# Patient Record
Sex: FEMALE | Race: WHITE | NOT HISPANIC OR LATINO | Employment: FULL TIME | URBAN - METROPOLITAN AREA
[De-identification: names, ages, dates, MRNs, and addresses within clinical notes are randomized per-mention and may not be internally consistent; named-entity substitution may affect disease eponyms.]

---

## 2017-01-25 ENCOUNTER — TELEPHONE (OUTPATIENT)
Dept: PSYCHIATRY | Facility: CLINIC | Age: 22
End: 2017-01-25

## 2017-01-31 ENCOUNTER — OFFICE VISIT (OUTPATIENT)
Dept: PSYCHIATRY | Facility: CLINIC | Age: 22
End: 2017-01-31
Payer: COMMERCIAL

## 2017-01-31 VITALS
HEART RATE: 76 BPM | SYSTOLIC BLOOD PRESSURE: 125 MMHG | DIASTOLIC BLOOD PRESSURE: 75 MMHG | WEIGHT: 175 LBS | BODY MASS INDEX: 28.46 KG/M2

## 2017-01-31 DIAGNOSIS — F41.0 PANIC ATTACK: Primary | ICD-10-CM

## 2017-01-31 PROCEDURE — 99214 OFFICE O/P EST MOD 30 MIN: CPT | Performed by: CLINICAL NURSE SPECIALIST

## 2017-01-31 RX ORDER — MIRTAZAPINE 30 MG/1
30 TABLET, FILM COATED ORAL AT BEDTIME
Qty: 30 TABLET | Refills: 3 | Status: SHIPPED | OUTPATIENT
Start: 2017-01-31 | End: 2017-03-09 | Stop reason: DRUGHIGH

## 2017-01-31 ASSESSMENT — ANXIETY QUESTIONNAIRES
6. BECOMING EASILY ANNOYED OR IRRITABLE: MORE THAN HALF THE DAYS
3. WORRYING TOO MUCH ABOUT DIFFERENT THINGS: NEARLY EVERY DAY
GAD7 TOTAL SCORE: 20
5. BEING SO RESTLESS THAT IT IS HARD TO SIT STILL: NEARLY EVERY DAY
2. NOT BEING ABLE TO STOP OR CONTROL WORRYING: NEARLY EVERY DAY
1. FEELING NERVOUS, ANXIOUS, OR ON EDGE: NEARLY EVERY DAY
7. FEELING AFRAID AS IF SOMETHING AWFUL MIGHT HAPPEN: NEARLY EVERY DAY

## 2017-01-31 ASSESSMENT — PATIENT HEALTH QUESTIONNAIRE - PHQ9: 5. POOR APPETITE OR OVEREATING: NEARLY EVERY DAY

## 2017-01-31 NOTE — PATIENT INSTRUCTIONS
Treatment Plan:  Continue mirtazapine (Remeron) 30 mg at bedtime.   Restart exercise routine.   Engage in social activities.  Continue light therapy.   Consider individual therapy.   Follow up in 2 months.       - Safety plan was reviewed; to the ER as needed or call after hours crisis line; [phone]  - Education and counseling was done regarding use of medications, psychotherapy options  - Call [phone] for appointment or to speak to a nurse.    -Office hours: Monday through Thursday 8:00 am to 4:30 pm; Friday 8:00 am to Noon.

## 2017-01-31 NOTE — MR AVS SNAPSHOT
"              After Visit Summary   1/31/2017    Pauly Callejas    MRN: [id number]           Patient Information     Date Of Birth          1995        Visit Information        Provider Department      1/31/2017 3:15 PM Patria Xavier APRN CNS [facility]        Today's Diagnoses     Panic attack    -  1       Care Instructions    Treatment Plan:  Continue mirtazapine (Remeron) 30 mg at bedtime.   Restart exercise routine.   Engage in social activities.  Continue light therapy.   Consider individual therapy.   Follow up in 2 months.       - Safety plan was reviewed; to the ER as needed or call after hours crisis line; [phone]  - Education and counseling was done regarding use of medications, psychotherapy options  - Call [phone] for appointment or to speak to a nurse.    -Office hours: Monday through Thursday 8:00 am to 4:30 pm; Friday 8:00 am to Noon.           Follow-ups after your visit        Who to contact     If you have questions or need follow up information about today's clinic visit or your schedule please contact [facility] directly at [phone].  Normal or non-critical lab and imaging results will be communicated to you by BuzzFeed[employer], letter or phone within 4 business days after the clinic has received the results. If you do not hear from us within 7 days, please contact the clinic through [employer] or phone. If you have a critical or abnormal lab result, we will notify you by phone as soon as possible.  Submit refill requests through [facility] or call your pharmacy and they will forward the refill request to us. Please allow 3 business days for your refill to be completed.          Additional Information About Your Visit        BuzzFeed[employer] Information     [facility] [facility] lets you send messages to your doctor, view your test results, renew your prescriptions, schedule appointments and more. To sign up, go to [email or web address] . Click on \"Log in\" on the left " "side of the screen, which will take you to the Welcome page. Then click on \"Sign up Now\" on the right side of the page.     You will be asked to enter the access code listed below, as well as some personal information. Please follow the directions to create your username and password.     Your access code is: [email or web address]  Expires: 2017 12:23 PM     Your access code will  in 90 days. If you need help or a new code, please call your [facility] or [phone].        Care EveryWhere ID     This is your Care EveryWhere ID. This could be used by other organizations to access your [place] medical records  [id number]        Your Vitals Were     Pulse                   76            Blood Pressure from Last 3 Encounters:   17 125/75   16 128/73   16 122/76    Weight from Last 3 Encounters:   17 175 lb (79.379 kg)   16 176 lb (79.833 kg)   16 168 lb (76.204 kg)              Today, you had the following     No orders found for display         Where to get your medicines      These medications were sent to [facility] [address] [place] [address] AT [facility] & [address] [address]     Phone:  [phone]    - mirtazapine 30 MG tablet       Primary Care Provider Office Phone # Fax #    Rosa Rojas PA-C [phone]       [facility] [address] [facility] [address]        Thank you!     Thank you for choosing [facility]  for your care. Our goal is always to provide you with excellent care. Hearing back from our patients is one way we can continue to improve our services. Please take a few minutes to complete the written survey that you may receive in the mail after your visit with us. Thank you!             Your Updated Medication List - Protect others around you: Learn how to safely use, store and throw away your medicines at " www.disposemymeds.org.          This list is accurate as of: 1/31/17  3:46 PM.  Always use your most recent med list.                   Brand Name Dispense Instructions for use    ibuprofen 200 MG capsule      Take 200 mg by mouth every 6 hours as needed. 3 tabs as needed       LORazepam 1 MG tablet    ATIVAN    20 tablet    Take 1 tablet (1 mg) by mouth daily as needed for anxiety       mirtazapine 30 MG tablet    REMERON    30 tablet    Take 1 tablet (30 mg) by mouth At Bedtime       norethindrone-ethinyl estradiol-iron 1.5-30 MG-MCG per tablet    MICROGESTIN FE1.5/30    112 tablet    Take 1 tablet by mouth daily Continuously

## 2017-01-31 NOTE — PROGRESS NOTES
"      Psychiatric Progress Note    Name: Pauly Callejas  Date: 1/31/2017  Length of Visit: 30 minutes  MRN: [id number]      Current Outpatient Prescriptions   Medication Sig     LORazepam (ATIVAN) 1 MG tablet Take 1 tablet (1 mg) by mouth daily as needed for anxiety     mirtazapine (REMERON) 30 MG tablet Take 1 tablet (30 mg) by mouth At Bedtime     norethindrone-ethinyl estradiol-iron (MICROGESTIN FE1.5/30) 1.5-30 MG-MCG per tablet Take 1 tablet by mouth daily Continuously     Ibuprofen 200 MG capsule Take 200 mg by mouth every 6 hours as needed. 3 tabs as needed     No current facility-administered medications for this visit.       Therapist:  None    PHQ-9:  PHQ-9 score:    PHQ-9 SCORE 1/31/2017   Total Score -   Total Score 19       KASANDRA-7:  KASANDRA-7 SCORE 11/3/2016 12/13/2016 1/31/2017   Total Score 21 15 20           Interim History:  Patient returns for follow up from appointment 12-. Mirtazapine (Remeron) was increased to 30 mg daily and lorazepam (Ativan) 1 mg daily as needed was continued. Light therapy and exercise was recommended. Today, patient reports not doing as well. Patient reports starting a second job two days ago, then decided against it due to work environment. Patient states \"I'm so broke\", having $17 left after paying her bills. Patient is now able to work overtime at her [employer] job. Parents \"are wrapping up their divorce\" so patient can move in with her mother which patient is viewing as positive. Patient reports therapy was not helpful due patient feeling therapist had her own agenda. Patient would like to work on separation anxiety and \"cling\" to her boyfriend. We had a lengthy discussion regarding finding activities that she can do independently from boyfriend. Patient reports taking lorazepam (Ativan) 1 mg approximately every 1-2 weeks.       Past Medical History   Diagnosis Date     Panic disorder      Depression with anxiety        10 point ROS is negative except for those " "listed above.     Vital Signs:   /75 mmHg  Pulse 76  Wt 175 lb (79.379 kg)      Mental Status Assessment:  Appearance:  Well groomed      Behavior/relationship to examiner/demeanor:  Cooperative, engaged and pleasant  Motor activity:  Normal  Gait:  Normal   Speech:  Normal in volume, articulation, coherence   Mood (subjective report):  \"We'll see\"  Affect (objective appearance):  Mood congruent  Thought Process (Associations):  Logical, linear and goal directed  Thought content:  No evidence of suicidal or homicidal ideation,          no overt psychosis and                    patient does not appear to be responding to internal stimuli  Oriented to person, place, date/time   Attention Span and concentration: Intact   Memory:  Short-term memory intact and Long-term memory; Intact  Language:  Fluent   Fund of Knowledge/Intelligence:  Average  Use of language: Intact   Abstraction:  Normal  Insight:  Adequate  Judgment:  Adequate for safety    DSM DIAGNOSIS:  296.32 Major Depressive Disorder, Recurrent Episode, Moderate _ and With anxious distress  300.02 (F41.1) Generalized Anxiety Disorder      Assessment:  Patient reports her mood declined as she recognizes she has no activities that she enjoys independent of her boyfriend. She was able to list several activities she enjoyed while in college and is encouraged to seek out like-minded people to establish friendships. Mirtazapine (Remeron) may require increase and patient agrees to contact this office if she is struggling even though she has made an effort to find enjoyable activities.     Medication side effects and alternatives were reviewed.     Treatment Plan:  Continue mirtazapine (Remeron) 30 mg at bedtime.   Restart exercise routine.   Engage in social activities.  Continue light therapy.   Consider individual therapy.   Follow up in 2 months.       - Safety plan was reviewed; to the ER as needed or call after hours crisis line; [phone]  - " Education and counseling was done regarding use of medications, psychotherapy options  - Call [phone] for appointment or to speak to a nurse.    -Office hours: Monday through Thursday 8:00 am to 4:30 pm; Friday 8:00 am to Noon.             Signed:  Patria Xavier RN, MS, CNS-BC

## 2017-02-01 ASSESSMENT — ANXIETY QUESTIONNAIRES: GAD7 TOTAL SCORE: 20

## 2017-02-01 ASSESSMENT — PATIENT HEALTH QUESTIONNAIRE - PHQ9: SUM OF ALL RESPONSES TO PHQ QUESTIONS 1-9: 19

## 2017-03-09 ENCOUNTER — OFFICE VISIT (OUTPATIENT)
Dept: PSYCHIATRY | Facility: CLINIC | Age: 22
End: 2017-03-09
Payer: COMMERCIAL

## 2017-03-09 VITALS
HEIGHT: 66 IN | BODY MASS INDEX: 28.93 KG/M2 | TEMPERATURE: 98.6 F | SYSTOLIC BLOOD PRESSURE: 125 MMHG | DIASTOLIC BLOOD PRESSURE: 81 MMHG | HEART RATE: 86 BPM | WEIGHT: 180 LBS | RESPIRATION RATE: 16 BRPM

## 2017-03-09 DIAGNOSIS — F41.1 GAD (GENERALIZED ANXIETY DISORDER): ICD-10-CM

## 2017-03-09 DIAGNOSIS — F41.0 PANIC ATTACK: ICD-10-CM

## 2017-03-09 DIAGNOSIS — F33.1 MAJOR DEPRESSIVE DISORDER, RECURRENT EPISODE, MODERATE (H): Primary | ICD-10-CM

## 2017-03-09 PROCEDURE — 99214 OFFICE O/P EST MOD 30 MIN: CPT | Performed by: CLINICAL NURSE SPECIALIST

## 2017-03-09 RX ORDER — MIRTAZAPINE 45 MG/1
45 TABLET, FILM COATED ORAL AT BEDTIME
Qty: 90 TABLET | Refills: 1 | Status: SHIPPED | OUTPATIENT
Start: 2017-03-09 | End: 2021-08-26

## 2017-03-09 RX ORDER — HYDROXYZINE HYDROCHLORIDE 25 MG/1
25-50 TABLET, FILM COATED ORAL EVERY 6 HOURS PRN
Qty: 60 TABLET | Refills: 1 | Status: SHIPPED | OUTPATIENT
Start: 2017-03-09

## 2017-03-09 ASSESSMENT — ANXIETY QUESTIONNAIRES
5. BEING SO RESTLESS THAT IT IS HARD TO SIT STILL: NEARLY EVERY DAY
IF YOU CHECKED OFF ANY PROBLEMS ON THIS QUESTIONNAIRE, HOW DIFFICULT HAVE THESE PROBLEMS MADE IT FOR YOU TO DO YOUR WORK, TAKE CARE OF THINGS AT HOME, OR GET ALONG WITH OTHER PEOPLE: EXTREMELY DIFFICULT
GAD7 TOTAL SCORE: 21
2. NOT BEING ABLE TO STOP OR CONTROL WORRYING: NEARLY EVERY DAY
7. FEELING AFRAID AS IF SOMETHING AWFUL MIGHT HAPPEN: NEARLY EVERY DAY
1. FEELING NERVOUS, ANXIOUS, OR ON EDGE: NEARLY EVERY DAY
3. WORRYING TOO MUCH ABOUT DIFFERENT THINGS: NEARLY EVERY DAY
6. BECOMING EASILY ANNOYED OR IRRITABLE: NEARLY EVERY DAY

## 2017-03-09 ASSESSMENT — PATIENT HEALTH QUESTIONNAIRE - PHQ9: 5. POOR APPETITE OR OVEREATING: NEARLY EVERY DAY

## 2017-03-09 NOTE — PROGRESS NOTES
"      Psychiatric Progress Note    Name: Pauly Callejas  Date: 3/9/2017  Length of Visit: 30 minutes  MRN: [id number]      Current Outpatient Prescriptions   Medication Sig     mirtazapine (REMERON) 30 MG tablet Take 1 tablet (30 mg) by mouth At Bedtime     LORazepam (ATIVAN) 1 MG tablet Take 1 tablet (1 mg) by mouth daily as needed for anxiety     norethindrone-ethinyl estradiol-iron (MICROGESTIN FE1.5/30) 1.5-30 MG-MCG per tablet Take 1 tablet by mouth daily Continuously     Ibuprofen 200 MG capsule Take 200 mg by mouth every 6 hours as needed. 3 tabs as needed     No current facility-administered medications for this visit.        Therapist:  None - has appointment scheduled 3-.     PHQ-9:  PHQ-9 score:    PHQ-9 SCORE 3/9/2017   Total Score 24       KASANDRA-7:  KASANDRA-7 SCORE 12/13/2016 1/31/2017 3/9/2017   Total Score 15 20 21           Interim History:  Patient returns for follow up from appointment 1-. Mirtazapine (Remeron) 30 mg at bedtime was continued. Recommendations for exercise, social activities, light therapy, and individual therapy.     Today, patient reports increased anxiety. Patient denies triggers. Patient reports feeling anxious most days, all day.  Patient states she has been \"jamal playing guitar\".  She reports she started exercising this week. She has scheduled with a therapist.       Past Medical History   Diagnosis Date     Depression with anxiety      Panic disorder        10 point ROS is negative except for those listed above.    Vital Signs:   /81  Pulse 86  Temp 98.6  F (37  C) (Tympanic)  Resp 16  Ht 5' 6\" (1.676 m)  Wt 180 lb (81.6 kg)  LMP 03/04/2017  BMI 29.05 kg/m2      Mental Status Assessment:  Appearance:  Well groomed      Behavior/relationship to examiner/demeanor:  Cooperative, engaged and pleasant  Motor activity:  Normal  Gait:  Normal   Speech:  Normal in volume, articulation, coherence   Mood (subjective report):  \"I'm so anxious\"  Affect (objective " appearance):  Mood congruent  Thought Process (Associations):  Logical, linear and goal directed  Thought content:  No evidence of suicidal or homicidal ideation,          no overt psychosis and                    patient does not appear to be responding to internal stimuli  Oriented to person, place, date/time   Attention Span and concentration: Intact   Memory:  Short-term memory intact and Long-term memory; Intact  Language:  Fluent   Fund of Knowledge/Intelligence:  Average  Use of language: Intact   Abstraction:  Normal  Insight:  Adequate  Judgment:  Adequate for safety    DSM DIAGNOSIS:  296.32 Major Depressive Disorder, Recurrent Episode, Moderate _ and With anxious distress  300.02 (F41.1) Generalized Anxiety Disorder    Assessment:  Patient is having a difficult time with anxiety. Increasing mirtazapine (Remeron) is likely to be helpful. Patient reports increased use of lorazepam (Ativan) and will utilize hydroxyzine to eliminate lorazepam (Ativan) withdrawal which may be increasing anxiety symptoms.     Medication side effects and alternatives were reviewed.     Treatment Plan:  Increase to mirtazapine (Remeron) 45 mg at bedtime.   Begin hydroxyzine 25-50 mg every 6 hours as needed for anxiety.     Follow up in 6 weeks.     - Recommend patient discuss medications with their pharmacist.   - Safety plan was reviewed; to the ER as needed or call after hours crisis line; [phone]  - Education and counseling was done regarding use of medications, psychotherapy options  - Call [phone] for appointment or to speak to a nurse.    -Office hours: Monday through Thursday 8:00 am to 4:30 pm; Friday 8:00 am to Noon.   - Patient received a copy of this Treatment Plan today.    Patient will continue to be seen for ongoing consultation and stabilization.      Signed:  Patria Xavier, RN, MS, CNS-BC

## 2017-03-09 NOTE — MR AVS SNAPSHOT
After Visit Summary   3/9/2017    Pauly Callejas    MRN: [id number]           Patient Information     Date Of Birth          1995        Visit Information        Provider Department      3/9/2017 2:45 PM Patria Xavier APRN CNS [facility]        Today's Diagnoses     Major depressive disorder, recurrent episode, moderate (H)    -  1    KASANDRA (generalized anxiety disorder)        Panic attack          Care Instructions    Treatment Plan:  Increase to mirtazapine (Remeron) 45 mg at bedtime.   Begin hydroxyzine 25-50 mg every 6 hours as needed for anxiety.     Follow up in 6 weeks.     - Recommend patient discuss medications with their pharmacist.   - Safety plan was reviewed; to the ER as needed or call after hours crisis line; [phone]  - Education and counseling was done regarding use of medications, psychotherapy options  - Call [phone] for appointment or to speak to a nurse.    -Office hours: Monday through Thursday 8:00 am to 4:30 pm; Friday 8:00 am to Noon.   - Patient received a copy of this Treatment Plan toda        Follow-ups after your visit        Who to contact     If you have questions or need follow up information about today's clinic visit or your schedule please contact [facility] directly at [phone].  Normal or non-critical lab and imaging results will be communicated to you by Entre[employer], letter or phone within 4 business days after the clinic has received the results. If you do not hear from us within 7 days, please contact the clinic through [email or web address] or phone. If you have a critical or abnormal lab result, we will notify you by phone as soon as possible.  Submit refill requests through [employer] or call your pharmacy and they will forward the refill request to us. Please allow 3 business days for your refill to be completed.          Additional Information About Your Visit        [employer] Information     [employer] lets you send messages  "to your doctor, view your test results, renew your prescriptions, schedule appointments and more. To sign up, go to [email or web address] . Click on \"Log in\" on the left side of the screen, which will take you to the Welcome page. Then click on \"Sign up Now\" on the right side of the page.     You will be asked to enter the access code listed below, as well as some personal information. Please follow the directions to create your username and password.     Your access code is: [email or web address]  Expires: 2017  3:14 PM     Your access code will  in 90 days. If you need help or a new code, please call your [place] clinic or [phone].        Care EveryWhere ID     This is your Care EveryWhere ID. This could be used by other organizations to access your [place] medical records  [id number]        Your Vitals Were     Pulse Temperature Respirations Height Last Period BMI (Body Mass Index)    86 98.6  F (37  C) (Tympanic) 16 5' 6\" (1.676 m) 2017 29.05 kg/m2       Blood Pressure from Last 3 Encounters:   17 125/81   17 125/75   16 128/73    Weight from Last 3 Encounters:   17 180 lb (81.6 kg)   17 175 lb (79.4 kg)   16 176 lb (79.8 kg)              Today, you had the following     No orders found for display         Today's Medication Changes          These changes are accurate as of: 3/9/17  3:14 PM.  If you have any questions, ask your nurse or doctor.               Start taking these medicines.        Dose/Directions    hydrOXYzine 25 MG tablet   Commonly known as:  ATARAX   Used for:  KASANDRA (generalized anxiety disorder)        Dose:  25-50 mg   Take 1-2 tablets (25-50 mg) by mouth every 6 hours as needed for anxiety   Quantity:  60 tablet   Refills:  1         These medicines have changed or have updated prescriptions.        Dose/Directions    mirtazapine 45 MG tablet   Commonly known as:  REMERON   This may have changed:    - medication strength  - how much to " take   Used for:  Major depressive disorder, recurrent episode, moderate (H), KASANDRA (generalized anxiety disorder)        Dose:  45 mg   Take 1 tablet (45 mg) by mouth At Bedtime   Quantity:  90 tablet   Refills:  1            Where to get your medicines      These medications were sent to [id number] - [place] - [address] AT [facility]  [address], [facility] [address]     Phone:  [phone]     hydrOXYzine 25 MG tablet    mirtazapine 45 MG tablet                Primary Care Provider Office Phone # Fax #    Rosa Rojas PA-C [phone]       [facility] [address] [facility] [address]        Thank you!     Thank you for choosing [facility]  for your care. Our goal is always to provide you with excellent care. Hearing back from our patients is one way we can continue to improve our services. Please take a few minutes to complete the written survey that you may receive in the mail after your visit with us. Thank you!             Your Updated Medication List - Protect others around you: Learn how to safely use, store and throw away your medicines at www.disposemymeds.org.          This list is accurate as of: 3/9/17  3:14 PM.  Always use your most recent med list.                   Brand Name Dispense Instructions for use    hydrOXYzine 25 MG tablet    ATARAX    60 tablet    Take 1-2 tablets (25-50 mg) by mouth every 6 hours as needed for anxiety       ibuprofen 200 MG capsule      Take 200 mg by mouth every 6 hours as needed. 3 tabs as needed       LORazepam 1 MG tablet    ATIVAN    20 tablet    Take 1 tablet (1 mg) by mouth daily as needed for anxiety       mirtazapine 45 MG tablet    REMERON    90 tablet    Take 1 tablet (45 mg) by mouth At Bedtime       norethindrone-ethinyl estradiol-iron 1.5-30 MG-MCG per tablet    MICROGESTIN FE1.5/30    112 tablet    Take 1 tablet by mouth daily  Continuously

## 2017-03-09 NOTE — PATIENT INSTRUCTIONS
Treatment Plan:  Increase to mirtazapine (Remeron) 45 mg at bedtime.   Begin hydroxyzine 25-50 mg every 6 hours as needed for anxiety.     Follow up in 6 weeks.     - Recommend patient discuss medications with their pharmacist.   - Safety plan was reviewed; to the ER as needed or call after hours crisis line; [phone]  - Education and counseling was done regarding use of medications, psychotherapy options  - Call [phone] for appointment or to speak to a nurse.    -Office hours: Monday through Thursday 8:00 am to 4:30 pm; Friday 8:00 am to Noon.   - Patient received a copy of this Treatment Plan toda

## 2017-03-10 ASSESSMENT — PATIENT HEALTH QUESTIONNAIRE - PHQ9: SUM OF ALL RESPONSES TO PHQ QUESTIONS 1-9: 24

## 2017-03-10 ASSESSMENT — ANXIETY QUESTIONNAIRES: GAD7 TOTAL SCORE: 21

## 2017-03-13 NOTE — PROGRESS NOTES
SUBJECTIVE:     CC: Pauly Callejas is an 21 year old woman who presents for preventive health visit.     Healthy Habits:    Do you get at least three servings of calcium containing foods daily (dairy, green leafy vegetables, etc.)? not    Amount of exercise or daily activities, outside of work: 0 day(s) per week    Problems taking medications regularly No    Medication side effects: No    Have you had an eye exam in the past two years? Yes 01/15/2017    Do you see a dentist twice per year? yes    Do you have sleep apnea, excessive snoring or daytime drowsiness?no         due for pap next year.   Periods-were heavy so is doing oral contraceptive pill for 3 months at a time.  Going well. No side effects or concerns.     Had biometric screening done in the winter, will bring in these labs. Was not fasting for her lipids but they were okay per patient.     Would like labs to make sure nothing contributing to her anxiety/depression per patient would like celiac, vitamin d, etc.     Had chlamydia in sept.  Of last year, was treated and re-checked and was negative. Would like screening again today. Has been monogamous with boyfriend currently.     Has depression/anxiety that is not doing great, she just saw psychiatrist and is now going to be seeing therapist. Get medication management [facility] [place]-will be starting therapy there soon.   Denies suicidal or homicidal thoughts.  Patient instructed to go to the emergency room or call 911 if these occur.          Today's PHQ-2 Score: No flowsheet data found.    Abuse: Current or Past(Physical, Sexual or Emotional)- No  Do you feel safe in your environment - Yes    Social History   Substance Use Topics     Smoking status: Never Smoker     Smokeless tobacco: Never Used     Alcohol use Yes      Comment: social     The patient does not drink >3 drinks per day nor >7 drinks per week.    No results for input(s): CHOL, HDL, LDL, TRIG, CHOLHDLRATIO,  [facility] in the last 78349 hours.    Reviewed orders with patient.  Reviewed health maintenance and updated orders accordingly - Yes    Mammo Decision Support:  Mammogram not appropriate for this patient based on age.    Pertinent mammograms are reviewed under the imaging tab.  History of abnormal Pap smear: NO - age 21-29 PAP every 3 years recommended    Reviewed and updated as needed this visit by clinical staff  Tobacco  Allergies  Meds  Med Hx  Surg Hx  Fam Hx  Soc Hx        Reviewed and updated as needed this visit by Provider        Past Medical History   Diagnosis Date     Depression with anxiety      Panic disorder       Past Surgical History   Procedure Laterality Date     No history of surgery       Obstetric History       T0      TAB0   SAB0   E0   M0   L0           ROS:  C: NEGATIVE for fever, chills, change in weight  I: NEGATIVE for worrisome rashes, moles or lesions  E: NEGATIVE for vision changes or irritation  ENT: NEGATIVE for ear, mouth and throat problems  R: NEGATIVE for significant cough or SOB  B: NEGATIVE for masses, tenderness or discharge  CV: NEGATIVE for chest pain, palpitations or peripheral edema  GI: NEGATIVE for nausea, abdominal pain, heartburn, or change in bowel habits  : NEGATIVE for unusual urinary or vaginal symptoms. Periods are regular.  M: NEGATIVE for significant arthralgias or myalgia  N: NEGATIVE for weakness, dizziness or paresthesias    Problem list, Medication list, Allergies, and Medical/Social/Surgical histories reviewed in [facility] and updated as appropriate.  OBJECTIVE:     /73  Pulse 75  Temp 98  F (36.7  C) (Oral)  Wt 183 lb (83 kg)  LMP 2017  SpO2 100%  Breastfeeding? No  BMI 29.54 kg/m2  EXAM:  GENERAL: healthy, alert and no distress  EYES: Eyes grossly normal to inspection, PERRL and conjunctivae and sclerae normal  HENT: ear canals and TM's normal, nose and mouth without ulcers or lesions  NECK: no adenopathy, no asymmetry, masses, or  "scars and thyroid normal to palpation  RESP: lungs clear to auscultation - no rales, rhonchi or wheezes  CV: regular rate and rhythm, normal S1 S2, no S3 or S4, no murmur, click or rub, no peripheral edema and peripheral pulses strong  ABDOMEN: soft, nontender, no hepatosplenomegaly, no masses and bowel sounds normal  MS: no gross musculoskeletal defects noted, no edema  NEURO: Normal strength and tone, mentation intact and speech normal  PSYCH: mentation appears normal, affect normal/bright    ASSESSMENT/PLAN:       Routine physical with abnormal findin    1. Depression with anxiety    - TSH with free T4 reflex  - Vitamin D Deficiency  - Vitamin B12  - CBC with platelets differential  - Comprehensive metabolic panel  - Tissue transglutaminase marc IgA and IgG    2. Screening examination for venereal disease    - NEISSERIA GONORRHOEA PCR  - CHLAMYDIA TRACHOMATIS PCR    3. KASANDRA (generalized anxiety disorder)      4. Breakthrough bleeding on birth control pills    - norethindrone-ethinyl estradiol-iron (MICROGESTIN FE1.5/30) 1.5-30 MG-MCG per tablet; Take 1 tablet by mouth daily Continuously. Stop for 1 week every 3 months.  Dispense: 112 tablet; Refill: 3    Routine physical with abnormal findings.       COUNSELING:   Reviewed preventive health counseling, as reflected in patient instructions       Regular exercise       Healthy diet/nutrition       Vision screening       Hearing screening         reports that she has never smoked. She has never used smokeless tobacco.    Estimated body mass index is 29.54 kg/(m^2) as calculated from the following:    Height as of 3/9/17: 5' 6\" (1.676 m).    Weight as of this encounter: 183 lb (83 kg).     Patient Instructions     Preventive Health Recommendations  Female Ages 18 to 25     Yearly exam:     See your health care provider every year in order to  o Review health changes.   o Discuss preventive care.    o Review your medicines if your doctor has prescribed any.      You " should be tested each year for STDs (sexually transmitted diseases).       After age 20, talk to your provider about how often you should have cholesterol testing.      Starting at age 21, get a Pap test every three years. If you have an abnormal result, your doctor may have you test more often.      If you are at risk for diabetes, you should have a diabetes test (fasting glucose).     Shots:     Get a flu shot each year.     Get a tetanus shot every 10 years.     Consider getting the shot (vaccine) that prevents cervical cancer (Gardasil).    Nutrition:     Eat at least 5 servings of fruits and vegetables each day.    Eat whole-grain bread, whole-wheat pasta and brown rice instead of white grains and rice.    Talk to your provider about Calcium and Vitamin D.     Lifestyle    Exercise at least 150 minutes a week each week (30 minutes a day, 5 days a week). This will help you control your weight and prevent disease.    Limit alcohol to one drink per day.    No smoking.     Wear sunscreen to prevent skin cancer.    See your dentist every six months for an exam and cleaning.            Counseling Resources:  ATP IV Guidelines  Pooled Cohorts Equation Calculator  Breast Cancer Risk Calculator  FRAX Risk Assessment  ICSI Preventive Guidelines  Dietary Guidelines for Americans, 2010  USDA's MyPlate  ASA Prophylaxis  Lung CA Screening    Rosa Rojas PA-C  [facility]

## 2017-03-14 ENCOUNTER — OFFICE VISIT (OUTPATIENT)
Dept: FAMILY MEDICINE | Facility: CLINIC | Age: 22
End: 2017-03-14
Payer: COMMERCIAL

## 2017-03-14 VITALS
WEIGHT: 183 LBS | HEART RATE: 75 BPM | BODY MASS INDEX: 29.54 KG/M2 | OXYGEN SATURATION: 100 % | DIASTOLIC BLOOD PRESSURE: 73 MMHG | TEMPERATURE: 98 F | SYSTOLIC BLOOD PRESSURE: 136 MMHG

## 2017-03-14 DIAGNOSIS — N92.1 BREAKTHROUGH BLEEDING ON BIRTH CONTROL PILLS: ICD-10-CM

## 2017-03-14 DIAGNOSIS — F41.1 GAD (GENERALIZED ANXIETY DISORDER): ICD-10-CM

## 2017-03-14 DIAGNOSIS — Z11.3 SCREENING EXAMINATION FOR VENEREAL DISEASE: ICD-10-CM

## 2017-03-14 DIAGNOSIS — F41.8 DEPRESSION WITH ANXIETY: ICD-10-CM

## 2017-03-14 DIAGNOSIS — Z00.01 ENCOUNTER FOR ROUTINE ADULT HEALTH EXAMINATION WITH ABNORMAL FINDINGS: Primary | ICD-10-CM

## 2017-03-14 DIAGNOSIS — F33.1 MODERATE EPISODE OF RECURRENT MAJOR DEPRESSIVE DISORDER (H): ICD-10-CM

## 2017-03-14 LAB
ALBUMIN SERPL-MCNC: 3.6 G/DL (ref 3.4–5)
ALP SERPL-CCNC: 60 U/L (ref 40–150)
ALT SERPL W P-5'-P-CCNC: 21 U/L (ref 0–50)
ANION GAP SERPL CALCULATED.3IONS-SCNC: 8 MMOL/L (ref 3–14)
AST SERPL W P-5'-P-CCNC: 12 U/L (ref 0–45)
BASOPHILS # BLD AUTO: 0 10E9/L (ref 0–0.2)
BASOPHILS NFR BLD AUTO: 0.4 %
BILIRUB SERPL-MCNC: 0.2 MG/DL (ref 0.2–1.3)
BUN SERPL-MCNC: 7 MG/DL (ref 7–30)
CALCIUM SERPL-MCNC: 9.2 MG/DL (ref 8.5–10.1)
CHLORIDE SERPL-SCNC: 105 MMOL/L (ref 94–109)
CO2 SERPL-SCNC: 26 MMOL/L (ref 20–32)
CREAT SERPL-MCNC: 0.74 MG/DL (ref 0.52–1.04)
DIFFERENTIAL METHOD BLD: NORMAL
EOSINOPHIL # BLD AUTO: 0.2 10E9/L (ref 0–0.7)
EOSINOPHIL NFR BLD AUTO: 2.6 %
ERYTHROCYTE [DISTWIDTH] IN BLOOD BY AUTOMATED COUNT: 12.7 % (ref 10–15)
GFR SERPL CREATININE-BSD FRML MDRD: NORMAL ML/MIN/1.7M2
GLUCOSE SERPL-MCNC: 98 MG/DL (ref 70–99)
HCT VFR BLD AUTO: 38.4 % (ref 35–47)
HGB BLD-MCNC: 12.2 G/DL (ref 11.7–15.7)
LYMPHOCYTES # BLD AUTO: 2.8 10E9/L (ref 0.8–5.3)
LYMPHOCYTES NFR BLD AUTO: 38.4 %
MCH RBC QN AUTO: 27.7 PG (ref 26.5–33)
MCHC RBC AUTO-ENTMCNC: 31.8 G/DL (ref 31.5–36.5)
MCV RBC AUTO: 87 FL (ref 78–100)
MONOCYTES # BLD AUTO: 0.7 10E9/L (ref 0–1.3)
MONOCYTES NFR BLD AUTO: 9.3 %
NEUTROPHILS # BLD AUTO: 3.6 10E9/L (ref 1.6–8.3)
NEUTROPHILS NFR BLD AUTO: 49.3 %
PLATELET # BLD AUTO: 312 10E9/L (ref 150–450)
POTASSIUM SERPL-SCNC: 3.7 MMOL/L (ref 3.4–5.3)
PROT SERPL-MCNC: 8.1 G/DL (ref 6.8–8.8)
RBC # BLD AUTO: 4.4 10E12/L (ref 3.8–5.2)
SODIUM SERPL-SCNC: 139 MMOL/L (ref 133–144)
TSH SERPL DL<=0.005 MIU/L-ACNC: 2.35 MU/L (ref 0.4–4)
VIT B12 SERPL-MCNC: 298 PG/ML (ref 193–986)
WBC # BLD AUTO: 7.2 10E9/L (ref 4–11)

## 2017-03-14 PROCEDURE — 80050 GENERAL HEALTH PANEL: CPT | Performed by: PHYSICIAN ASSISTANT

## 2017-03-14 PROCEDURE — 99395 PREV VISIT EST AGE 18-39: CPT | Performed by: PHYSICIAN ASSISTANT

## 2017-03-14 PROCEDURE — 82306 VITAMIN D 25 HYDROXY: CPT | Performed by: PHYSICIAN ASSISTANT

## 2017-03-14 PROCEDURE — 87591 N.GONORRHOEAE DNA AMP PROB: CPT | Performed by: PHYSICIAN ASSISTANT

## 2017-03-14 PROCEDURE — 83516 IMMUNOASSAY NONANTIBODY: CPT | Performed by: PHYSICIAN ASSISTANT

## 2017-03-14 PROCEDURE — 36415 COLL VENOUS BLD VENIPUNCTURE: CPT | Performed by: PHYSICIAN ASSISTANT

## 2017-03-14 PROCEDURE — 87491 CHLMYD TRACH DNA AMP PROBE: CPT | Performed by: PHYSICIAN ASSISTANT

## 2017-03-14 PROCEDURE — 82607 VITAMIN B-12: CPT | Performed by: PHYSICIAN ASSISTANT

## 2017-03-14 RX ORDER — NORETHINDRONE ACETATE AND ETHINYL ESTRADIOL 1.5-30(21)
1 KIT ORAL DAILY
Qty: 112 TABLET | Refills: 3 | Status: SHIPPED | OUTPATIENT
Start: 2017-03-14 | End: 2018-04-01

## 2017-03-14 NOTE — Clinical Note
Please abstract the following data from this visit with this patient into the appropriate field in Epic:  Pap smear done on this date: 04/01/2015 (approximately), by this group: [facility] results were normal.

## 2017-03-14 NOTE — MR AVS SNAPSHOT
After Visit Summary   3/14/2017    Pauly Callejas    MRN: [id number]           Patient Information     Date Of Birth          1995        Visit Information        Provider Department      3/14/2017 3:20 PM Rosa Rojas PA-C [facility]        Today's Diagnoses     Depression with anxiety    -  1    Screening examination for venereal disease        KASANDRA (generalized anxiety disorder)        Breakthrough bleeding on birth control pills          Care Instructions      Preventive Health Recommendations  Female Ages 18 to 25     Yearly exam:     See your health care provider every year in order to  o Review health changes.   o Discuss preventive care.    o Review your medicines if your doctor has prescribed any.      You should be tested each year for STDs (sexually transmitted diseases).       After age 20, talk to your provider about how often you should have cholesterol testing.      Starting at age 21, get a Pap test every three years. If you have an abnormal result, your doctor may have you test more often.      If you are at risk for diabetes, you should have a diabetes test (fasting glucose).     Shots:     Get a flu shot each year.     Get a tetanus shot every 10 years.     Consider getting the shot (vaccine) that prevents cervical cancer (Gardasil).    Nutrition:     Eat at least 5 servings of fruits and vegetables each day.    Eat whole-grain bread, whole-wheat pasta and brown rice instead of white grains and rice.    Talk to your provider about Calcium and Vitamin D.     Lifestyle    Exercise at least 150 minutes a week each week (30 minutes a day, 5 days a week). This will help you control your weight and prevent disease.    Limit alcohol to one drink per day.    No smoking.     Wear sunscreen to prevent skin cancer.    See your dentist every six months for an exam and cleaning.        Follow-ups after your visit        Who to contact     If you have questions or  need follow up information about today's clinic visit or your schedule please contact [facility] directly at [phone].  Normal or non-critical lab and imaging results will be communicated to you by MyChart, letter or phone within 4 business days after the clinic has received the results. If you do not hear from us within 7 days, please contact the clinic through MyChart or phone. If you have a critical or abnormal lab result, we will notify you by phone as soon as possible.  Submit refill requests through [employer]Before or call your pharmacy and they will forward the refill request to us. Please allow 3 business days for your refill to be completed.          Additional Information About Your Visit        Care EveryWhere ID     This is your Care EveryWhere ID. This could be used by other organizations to access your [place] medical records  [id number]        Your Vitals Were     Pulse Temperature Last Period Pulse Oximetry Breastfeeding? BMI (Body Mass Index)    75 98  F (36.7  C) (Oral) 03/04/2017 100% No 29.54 kg/m2       Blood Pressure from Last 3 Encounters:   03/14/17 136/73   03/09/17 125/81   01/31/17 125/75    Weight from Last 3 Encounters:   03/14/17 183 lb (83 kg)   03/09/17 180 lb (81.6 kg)   01/31/17 175 lb (79.4 kg)              We Performed the Following     CBC with platelets differential     CHLAMYDIA TRACHOMATIS PCR     Comprehensive metabolic panel     DEPRESSION ACTION PLAN (DAP) Order [[id number]]     NEISSERIA GONORRHOEA PCR     Tissue transglutaminase marc IgA and IgG     TSH with free T4 reflex     Vitamin B12     Vitamin D Deficiency          Today's Medication Changes          These changes are accurate as of: 3/14/17  3:51 PM.  If you have any questions, ask your nurse or doctor.               These medicines have changed or have updated prescriptions.        Dose/Directions    norethindrone-ethinyl estradiol-iron 1.5-30 MG-MCG per tablet   Commonly known as:  MICROGESTIN FE1.5/30    This may have changed:  additional instructions   Used for:  Breakthrough bleeding on birth control pills   Changed by:  Rosa Rojas PA-C        Dose:  1 tablet   Take 1 tablet by mouth daily Continuously. Stop for 1 week every 3 months.   Quantity:  112 tablet   Refills:  3            Where to get your medicines      These medications were sent to [facility] [id number]  [place] - [address] [facility] [address], [facility] [address]     Phone:  [phone]     norethindrone-ethinyl estradiol-iron 1.5-30 MG-MCG per tablet                Primary Care Provider Office Phone # Fax #    Rosa Rojas PA-C [phone]       [facility] [address] [facility] [address]        Thank you!     Thank you for choosing [facility]  for your care. Our goal is always to provide you with excellent care. Hearing back from our patients is one way we can continue to improve our services. Please take a few minutes to complete the written survey that you may receive in the mail after your visit with us. Thank you!             Your Updated Medication List - Protect others around you: Learn how to safely use, store and throw away your medicines at www.disposemymeds.org.          This list is accurate as of: 3/14/17  3:51 PM.  Always use your most recent med list.                   Brand Name Dispense Instructions for use    hydrOXYzine 25 MG tablet    ATARAX    60 tablet    Take 1-2 tablets (25-50 mg) by mouth every 6 hours as needed for anxiety       ibuprofen 200 MG capsule      Take 200 mg by mouth every 6 hours as needed. 3 tabs as needed       LORazepam 1 MG tablet    ATIVAN    20 tablet    Take 1 tablet (1 mg) by mouth daily as needed for anxiety       mirtazapine 45 MG tablet    REMERON    90 tablet    Take 1 tablet (45 mg) by mouth At Bedtime       norethindrone-ethinyl estradiol-iron 1.5-30 MG-MCG  per tablet    MICROGESTIN FE1.5/30    112 tablet    Take 1 tablet by mouth daily Continuously. Stop for 1 week every 3 months.

## 2017-03-14 NOTE — NURSING NOTE
"Chief Complaint   Patient presents with     Physical     AFE, Pt is not fasting and did not have labs done        Initial /73  Pulse 75  Temp 98  F (36.7  C) (Oral)  Wt 183 lb (83 kg)  LMP 03/04/2017  SpO2 100%  Breastfeeding? No  BMI 29.54 kg/m2 Estimated body mass index is 29.54 kg/(m^2) as calculated from the following:    Height as of 3/9/17: 5' 6\" (1.676 m).    Weight as of this encounter: 183 lb (83 kg).  Medication Reconciliation: complete      Eduardo Clark MA    "

## 2017-03-15 LAB
C TRACH DNA SPEC QL NAA+PROBE: NORMAL
DEPRECATED CALCIDIOL+CALCIFEROL SERPL-MC: 31 UG/L (ref 20–75)
N GONORRHOEA DNA SPEC QL NAA+PROBE: NORMAL
SPECIMEN SOURCE: NORMAL
SPECIMEN SOURCE: NORMAL

## 2017-03-16 ENCOUNTER — TELEPHONE (OUTPATIENT)
Dept: FAMILY MEDICINE | Facility: CLINIC | Age: 22
End: 2017-03-16

## 2017-03-16 LAB
TTG IGA SER-ACNC: 1 U/ML
TTG IGG SER-ACNC: NORMAL U/ML

## 2017-03-16 NOTE — TELEPHONE ENCOUNTER
Pt notified of provider message as written.  Pt verbalized good understanding.  Becky Barksdale RN

## 2017-03-16 NOTE — TELEPHONE ENCOUNTER
Please call patient with labs ok to leave vm:     Dear Pauly,       It was a pleasure to see you at your recent office visit.  Your test results are listed below.  Labs are all normal.  Celiac testing negative. Vitamin d normal.  Thyroid normal. White and red blood cell counts normal.  No anemia.  Sodium, potassium, kidney and liver function normal.  Blood sugar normal, no diabetes.             If you have any questions or concerns, please call the clinic at [phone].     Sincerely,   Rosa Rojas PA-C

## 2017-03-16 NOTE — PROGRESS NOTES
Please call patient with labs ok to leave vm:    Dear Pauly,      It was a pleasure to see you at your recent office visit.  Your test results are listed below.  Labs are all normal.  Celiac testing negative. Vitamin d normal.  Thyroid normal. White and red blood cell counts normal.  No anemia.  Sodium, potassium, kidney and liver function normal.  Blood sugar normal, no diabetes.            If you have any questions or concerns, please call the clinic at [phone].    Sincerely,  Rosa Rojas PA-C

## 2017-05-17 ENCOUNTER — TELEPHONE (OUTPATIENT)
Dept: FAMILY MEDICINE | Facility: CLINIC | Age: 22
End: 2017-05-17

## 2017-05-17 NOTE — TELEPHONE ENCOUNTER
PHQ-9 due now for patient ( 5-7 months from index date)  Index date:       10/13/16  Index PHQ9 :   20  FU start date:   3/13/17  FU End date :   5/13/17    RN- Contact patient for PHQ-9. Remission considered if follow up PHQ-9 less than 5.  If greater than 5 consider follow up appointment, e-visit for medication follow up and evaluation.

## 2017-06-26 ENCOUNTER — OFFICE VISIT (OUTPATIENT)
Dept: FAMILY MEDICINE | Facility: CLINIC | Age: 22
End: 2017-06-26
Payer: COMMERCIAL

## 2017-06-26 VITALS
BODY MASS INDEX: 29.54 KG/M2 | SYSTOLIC BLOOD PRESSURE: 130 MMHG | RESPIRATION RATE: 15 BRPM | HEART RATE: 74 BPM | TEMPERATURE: 99 F | WEIGHT: 183 LBS | OXYGEN SATURATION: 100 % | DIASTOLIC BLOOD PRESSURE: 83 MMHG

## 2017-06-26 DIAGNOSIS — R30.0 DYSURIA: Primary | ICD-10-CM

## 2017-06-26 LAB
ALBUMIN UR-MCNC: NEGATIVE MG/DL
APPEARANCE UR: CLEAR
BILIRUB UR QL STRIP: NEGATIVE
COLOR UR AUTO: YELLOW
GLUCOSE UR STRIP-MCNC: NEGATIVE MG/DL
HGB UR QL STRIP: ABNORMAL
KETONES UR STRIP-MCNC: NEGATIVE MG/DL
LEUKOCYTE ESTERASE UR QL STRIP: NEGATIVE
NITRATE UR QL: NEGATIVE
NON-SQ EPI CELLS #/AREA URNS LPF: NORMAL /LPF
PH UR STRIP: 6 PH (ref 5–7)
RBC #/AREA URNS AUTO: NORMAL /HPF (ref 0–2)
SP GR UR STRIP: <=1.005 (ref 1–1.03)
URN SPEC COLLECT METH UR: ABNORMAL
UROBILINOGEN UR STRIP-ACNC: 0.2 EU/DL (ref 0.2–1)
WBC #/AREA URNS AUTO: NORMAL /HPF (ref 0–2)

## 2017-06-26 PROCEDURE — 81001 URINALYSIS AUTO W/SCOPE: CPT | Performed by: NURSE PRACTITIONER

## 2017-06-26 PROCEDURE — 99213 OFFICE O/P EST LOW 20 MIN: CPT | Mod: 25 | Performed by: NURSE PRACTITIONER

## 2017-06-26 PROCEDURE — 87086 URINE CULTURE/COLONY COUNT: CPT | Performed by: NURSE PRACTITIONER

## 2017-06-26 ASSESSMENT — PAIN SCALES - GENERAL: PAINLEVEL: MODERATE PAIN (5)

## 2017-06-26 NOTE — PROGRESS NOTES
SUBJECTIVE:                                                    Pauly Callejas is a 22 year old female who presents to clinic today for the following health issues:      URINARY TRACT SYMPTOMS      Duration: 1 day    Description  dysuria, urgency and back pain    Intensity:  moderate    Accompanying signs and symptoms:  Fever/chills: no   Flank pain no   Nausea and vomiting: no   Vaginal symptoms: none  Abdominal/Pelvic Pain: no     History  History of frequent UTI's: no   History of kidney stones: no   Sexually Active: YES  Possibility of pregnancy: No    Precipitating or alleviating factors: None    Therapies tried and outcome: Cranberry juice, water    Problem list and histories reviewed & adjusted, as indicated.  Additional history: as documented    Patient Active Problem List   Diagnosis     Hyperhidrosis of axilla     Moderate episode of recurrent major depressive disorder (H)     KASANDRA (generalized anxiety disorder)     Panic disorder without agoraphobia     Past Surgical History:   Procedure Laterality Date     NO HISTORY OF SURGERY         Social History   Substance Use Topics     Smoking status: Never Smoker     Smokeless tobacco: Never Used     Alcohol use Yes      Comment: social     Family History   Problem Relation Age of Onset     Unknown/Adopted Father      Unknown/Adopted Paternal Grandmother      Unknown/Adopted Paternal Grandfather      Hypertension Mother      Depression Mother      Anxiety Disorder Mother      DIABETES Maternal Grandmother      Depression Maternal Grandmother      Anxiety Disorder Maternal Grandmother      Depression Maternal Grandfather      Anxiety Disorder Maternal Grandfather            Reviewed and updated as needed this visit by clinical staff  Tobacco       Reviewed and updated as needed this visit by Provider         ROS:  Constitutional, HEENT, cardiovascular, pulmonary, gi and gu systems are negative, except as otherwise noted.    OBJECTIVE:     /83  Pulse 74   Temp 99  F (37.2  C) (Oral)  Resp 15  Wt 183 lb (83 kg)  SpO2 100%  Breastfeeding? No  BMI 29.54 kg/m2  Body mass index is 29.54 kg/(m^2).  GENERAL: healthy, alert and no distress  RESP: lungs clear to auscultation - no rales, rhonchi or wheezes  CV: regular rate and rhythm, normal S1 S2, no S3 or S4, no murmur, click or rub, no peripheral edema and peripheral pulses strong  ABDOMEN: soft, nontender, no hepatosplenomegaly, no masses and bowel sounds normal  SKIN: no suspicious lesions or rashes    Diagnostic Test Results:  Results for orders placed or performed in visit on 06/26/17 (from the past 24 hour(s))   *UA reflex to Microscopic and Culture ([place] and [facility] (except [place] and [place])   Result Value Ref Range    Color Urine Yellow     Appearance Urine Clear     Glucose Urine Negative NEG mg/dL    Bilirubin Urine Negative NEG    Ketones Urine Negative NEG mg/dL    Specific Gravity Urine <=1.005 1.003 - 1.035    Blood Urine Small (A) NEG    pH Urine 6.0 5.0 - 7.0 pH    Protein Albumin Urine Negative NEG mg/dL    Urobilinogen Urine 0.2 0.2 - 1.0 EU/dL    Nitrite Urine Negative NEG    Leukocyte Esterase Urine Negative NEG    Source Midstream Urine    Urine Microscopic   Result Value Ref Range    WBC Urine O - 2 0 - 2 /HPF    RBC Urine O - 2 0 - 2 /HPF    Squamous Epithelial /LPF Urine Few FEW /LPF       ASSESSMENT/PLAN:       1. Dysuria  Urine negative today, culture ordered    See Patient Instructions    DIAMOND Ryan [facility]

## 2017-06-26 NOTE — LETTER
[facility]  [address] [facility] [address] [phone]        June 29, 2017    Pauly Callejas  [address] [facility] [address]            Dear Pauly,    The results of your recent tests were normal.  Below is a copy of the results. If your symptoms persist we recommend being re-evaluated. It was a pleasure to see you at your last appointment.    If you have any questions or concerns, please call myself or my nurse at [phone].    Sincerely,    Dariana FIELDS, CNP    Results for orders placed or performed in visit on 06/26/17   *UA reflex to Microscopic and Culture ([place] and [facility] (except [place] and [place])   Result Value Ref Range    Color Urine Yellow     Appearance Urine Clear     Glucose Urine Negative NEG mg/dL    Bilirubin Urine Negative NEG    Ketones Urine Negative NEG mg/dL    Specific Gravity Urine <=1.005 1.003 - 1.035    Blood Urine Small (A) NEG    pH Urine 6.0 5.0 - 7.0 pH    Protein Albumin Urine Negative NEG mg/dL    Urobilinogen Urine 0.2 0.2 - 1.0 EU/dL    Nitrite Urine Negative NEG    Leukocyte Esterase Urine Negative NEG    Source Midstream Urine    Urine Microscopic   Result Value Ref Range    WBC Urine O - 2 0 - 2 /HPF    RBC Urine O - 2 0 - 2 /HPF    Squamous Epithelial /LPF Urine Few FEW /LPF   Urine Culture Aerobic Bacterial   Result Value Ref Range    Specimen Description Midstream Urine     Culture Micro       <10,000 colonies/mL urogenital zander Susceptibility testing not routinely done    Micro Report Status FINAL 06/27/2017

## 2017-06-26 NOTE — MR AVS SNAPSHOT
"              After Visit Summary   2017    Pauly Callejas    MRN: [id number]           Patient Information     Date Of Birth          1995        Visit Information        Provider Department      2017 3:00 PM Dariana Patel APRN CNP [facility]        Today's Diagnoses     Dysuria    -  1       Follow-ups after your visit        Who to contact     If you have questions or need follow up information about today's clinic visit or your schedule please contact [facility] directly at [phone].  Normal or non-critical lab and imaging results will be communicated to you by Scouponhart, letter or phone within 4 business days after the clinic has received the results. If you do not hear from us within 7 days, please contact the clinic through Scouponhart or phone. If you have a critical or abnormal lab result, we will notify you by phone as soon as possible.  Submit refill requests through Jaspersoft or call your pharmacy and they will forward the refill request to us. Please allow 3 business days for your refill to be completed.          Additional Information About Your Visit        MyChart Information     Jaspersoft lets you send messages to your doctor, view your test results, renew your prescriptions, schedule appointments and more. To sign up, go to [email or web address] . Click on \"Log in\" on the left side of the screen, which will take you to the Welcome page. Then click on \"Sign up Now\" on the right side of the page.     You will be asked to enter the access code listed below, as well as some personal information. Please follow the directions to create your username and password.     Your access code is: [email or web address]  Expires: 2017  3:54 PM     Your access code will  in 90 days. If you need help or a new code, please call your [facility] or [phone].        Care EveryWhere ID     This is your Care EveryWhere ID. This could be used by other organizations to " access your [facility] medical records  [id number]        Your Vitals Were     Pulse Temperature Respirations Pulse Oximetry Breastfeeding? BMI (Body Mass Index)    74 99  F (37.2  C) (Oral) 15 100% No 29.54 kg/m2       Blood Pressure from Last 3 Encounters:   06/26/17 130/83   03/14/17 136/73   03/09/17 125/81    Weight from Last 3 Encounters:   06/26/17 183 lb (83 kg)   03/14/17 183 lb (83 kg)   03/09/17 180 lb (81.6 kg)              We Performed the Following     *UA reflex to Microscopic and Culture ([place] and [facility] (except [place] and [place])     Urine Culture Aerobic Bacterial     Urine Microscopic        Primary Care Provider Office Phone # Fax #    Rosa Rojas PA-C [phone]       [facility] [address] [facility] [address]        Equal Access to Services     LISSETTE FRIEND : Hadii yang ku hadasho Soomaali, waaxda luqadaha, qaybta kaalmada adeegyada, waxay eunicein hayflakiton monty raines . So [facility] [phone].    ATENCIÓN: Si habla español, tiene a luong disposición servicios gratuitos de asistencia lingüística. Llame al [phone].    We comply with applicable federal civil rights laws and [place] laws. We do not discriminate on the basis of race, color, national origin, age, disability sex, sexual orientation or gender identity.            Thank you!     Thank you for choosing [facility]  for your care. Our goal is always to provide you with excellent care. Hearing back from our patients is one way we can continue to improve our services. Please take a few minutes to complete the written survey that you may receive in the mail after your visit with us. Thank you!             Your Updated Medication List - Protect others around you: Learn how to safely use, store and throw away your medicines at www.disposemymeds.org.          This list is accurate as of: 6/26/17  3:54 PM.  Always use your most recent med list.                    Brand Name Dispense Instructions for use Diagnosis    hydrOXYzine 25 MG tablet    ATARAX    60 tablet    Take 1-2 tablets (25-50 mg) by mouth every 6 hours as needed for anxiety    KASANDRA (generalized anxiety disorder)       ibuprofen 200 MG capsule      Take 200 mg by mouth every 6 hours as needed. 3 tabs as needed        LORazepam 1 MG tablet    ATIVAN    20 tablet    Take 1 tablet (1 mg) by mouth daily as needed for anxiety    Panic attack       mirtazapine 45 MG tablet    REMERON    90 tablet    Take 1 tablet (45 mg) by mouth At Bedtime    Major depressive disorder, recurrent episode, moderate (H), KASANDRA (generalized anxiety disorder)       norethindrone-ethinyl estradiol-iron 1.5-30 MG-MCG per tablet    MICROGESTIN FE1.5/30    112 tablet    Take 1 tablet by mouth daily Continuously. Stop for 1 week every 3 months.    Breakthrough bleeding on birth control pills

## 2017-06-27 LAB
BACTERIA SPEC CULT: NORMAL
MICRO REPORT STATUS: NORMAL
SPECIMEN SOURCE: NORMAL

## 2017-10-11 ENCOUNTER — TRANSFERRED RECORDS (OUTPATIENT)
Dept: HEALTH INFORMATION MANAGEMENT | Facility: CLINIC | Age: 22
End: 2017-10-11

## 2018-04-01 DIAGNOSIS — N92.1 BREAKTHROUGH BLEEDING ON BIRTH CONTROL PILLS: ICD-10-CM

## 2018-04-01 NOTE — LETTER
April 2, 2018    Pauly Callejas  [address] [facility] [address]    Dear Pauly,       We recently received a refill request for BLISOVI FE 1.5/30 1.5-30 MG-MCG per tablet.  We have refilled this for a one time 30 day supply only because you are due for a:    Physical office visit      Please call at your earliest convenience so that there will not be a delay with your future refills.          Thank you,   Your [facility] Team/  [phone]

## 2018-04-02 RX ORDER — NORETHINDRONE ACETATE AND ETHINYL ESTRADIOL 1.5-30(21)
KIT ORAL
Qty: 30 TABLET | Refills: 0 | Status: SHIPPED | OUTPATIENT
Start: 2018-04-02 | End: 2021-08-26

## 2018-04-02 NOTE — TELEPHONE ENCOUNTER
#30 only as patient is overdue for appointment   TC, patient due for:  AFE    Health Maintenance Due   Topic Date Due     HPV IMMUNIZATION (1 of 3 - Female 3 Dose Series) 05/07/2005     PHQ-9 Q6 MONTHS  09/09/2017     CHLAMYDIA SCREENING  03/14/2018     DEPRESSION ACTION PLAN Q1 YR  03/14/2018     Damaris Galeano RN

## 2018-08-23 DIAGNOSIS — N92.1 BREAKTHROUGH BLEEDING ON BIRTH CONTROL PILLS: ICD-10-CM

## 2018-08-23 RX ORDER — NORETHINDRONE ACETATE AND ETHINYL ESTRADIOL 1.5-30(21)
KIT ORAL
Qty: 84 TABLET | Refills: 0 | OUTPATIENT
Start: 2018-08-23

## 2018-08-23 NOTE — TELEPHONE ENCOUNTER
Routing refill request to provider for review/approval because:  Jaci given x1 and patient did not follow up, please advise  Becky Barksdale BSN, RN

## 2018-09-03 ENCOUNTER — HEALTH MAINTENANCE LETTER (OUTPATIENT)
Age: 23
End: 2018-09-03

## 2021-07-19 ENCOUNTER — TRANSFERRED RECORDS (OUTPATIENT)
Dept: HEALTH INFORMATION MANAGEMENT | Facility: CLINIC | Age: 26
End: 2021-07-19

## 2021-07-20 ENCOUNTER — TRANSCRIBE ORDERS (OUTPATIENT)
Dept: OTHER | Age: 26
End: 2021-07-20

## 2021-07-20 DIAGNOSIS — H47.10 PAPILLEDEMA: Primary | ICD-10-CM

## 2021-07-22 ENCOUNTER — TELEPHONE (OUTPATIENT)
Dept: OPHTHALMOLOGY | Facility: CLINIC | Age: 26
End: 2021-07-22

## 2021-07-22 NOTE — TELEPHONE ENCOUNTER
Scheduled firstavailable with Dr. Fernandez 8- at 0730    Pt aware of date/time/location/duration at [facility]    Pt has [phone] option 4 for information for any sudden vision loss/worsening symptoms to page on call eye provider    Pt also has main clinic number    Pt seemed comfortable with scheduling    Note to  to mail out new patient packet    Toni Bauman RN 9:13 AM 07/23/21    ---        Spoke to pt at 1655    Bilateral optic nerve swelling on exam July 19th-- Dr. Vladimir CLARK    Pt was noticing more tension headaches with sensation vision left could be changing-- but hasn't per pt and vision acute and no noted peripheral vision changes.    Tension headaches since December    No eye pain     Eyes feel sore-- more when looking up or to the sides.    Reviewed with review referral with neuro-ophthalmology team/notes once received and call pt back-- likely tomorrow afternoon    Toni Bauman RN 5:02 PM 07/22/21                      [phone] home/cell     Left message with direct number at [phone]    Toni Bauman RN 4:51 PM 07/22/21

## 2021-07-22 NOTE — TELEPHONE ENCOUNTER
[facility] Call Center    Phone Message    May a detailed message be left on voicemail: no     Reason for Call: Appointment Intake    Referring physicians name: Britta Mccall O.D.  Clinic name: [facility] phone #: [phone]  Clinic fax #: [phone]  Physician requested (if any): Dr Parkre Fernandez  Reason for appointment: papilledema OS>OD appearance of ONH, headaches daily, and fuzzy temporal vision OS  Speciality requested: Neuro-Ophthalmology    Action Taken: Message routed to:  [facility] OPHTHALMOLOGY [facility]ULT [facility] [[id number]]    Travel Screening: Not Applicable

## 2021-08-26 ENCOUNTER — OFFICE VISIT (OUTPATIENT)
Dept: OPHTHALMOLOGY | Facility: CLINIC | Age: 26
End: 2021-08-26
Attending: OPHTHALMOLOGY
Payer: COMMERCIAL

## 2021-08-26 DIAGNOSIS — H53.40 VISUAL FIELD DEFECT: Primary | ICD-10-CM

## 2021-08-26 DIAGNOSIS — H47.333 PSEUDOPAPILLEDEMA, BILATERAL: Primary | ICD-10-CM

## 2021-08-26 PROCEDURE — 99204 OFFICE O/P NEW MOD 45 MIN: CPT | Mod: GC | Performed by: OPHTHALMOLOGY

## 2021-08-26 PROCEDURE — G0463 HOSPITAL OUTPT CLINIC VISIT: HCPCS

## 2021-08-26 PROCEDURE — 92133 CPTRZD OPH DX IMG PST SGM ON: CPT | Performed by: OPHTHALMOLOGY

## 2021-08-26 PROCEDURE — 92083 EXTENDED VISUAL FIELD XM: CPT | Performed by: OPHTHALMOLOGY

## 2021-08-26 RX ORDER — CETIRIZINE HYDROCHLORIDE 10 MG/1
TABLET ORAL DAILY
COMMUNITY

## 2021-08-26 RX ORDER — CHOLECALCIFEROL (VITAMIN D3) 50 MCG
TABLET ORAL DAILY
COMMUNITY

## 2021-08-26 RX ORDER — MONTELUKAST SODIUM 10 MG/1
10 TABLET ORAL DAILY
COMMUNITY
Start: 2021-03-12

## 2021-08-26 ASSESSMENT — TONOMETRY
OD_IOP_MMHG: 14
IOP_METHOD: ICARE
OS_IOP_MMHG: 12

## 2021-08-26 ASSESSMENT — EXTERNAL EXAM - LEFT EYE: OS_EXAM: NORMAL

## 2021-08-26 ASSESSMENT — EXTERNAL EXAM - RIGHT EYE: OD_EXAM: NORMAL

## 2021-08-26 ASSESSMENT — VISUAL ACUITY
OS_CC: 20/20
OS_CC+: -2
OD_CC: 20/25
METHOD: SNELLEN - LINEAR
OD_CC+: +2
CORRECTION_TYPE: CONTACTS

## 2021-08-26 ASSESSMENT — SLIT LAMP EXAM - LIDS
COMMENTS: NORMAL
COMMENTS: NORMAL

## 2021-08-26 ASSESSMENT — REFRACTION_WEARINGRX
OD_SPHERE: +4.75
OD_AXIS: 070
OS_AXIS: 095
OS_SPHERE: +4.50
OD_CYLINDER: +0.50
OS_CYLINDER: +0.50
OD_SPHERE: +4.25
OS_SPHERE: +5.00

## 2021-08-26 ASSESSMENT — CUP TO DISC RATIO
OS_RATIO: 0.1
OD_RATIO: 0.1

## 2021-08-26 ASSESSMENT — CONF VISUAL FIELD: OS_INFERIOR_NASAL_RESTRICTION: 3

## 2021-08-26 NOTE — Clinical Note
8/26/2021       RE: Pauly Callejas  [address] [facility] [address]     Dear Colleague,    Thank you for referring your patient, Pauly Callejas, to the [facility] at [facility]. Please see a copy of my visit note below.         Assessment & Plan     Pauly Callejas is a 26 year old female with the following diagnoses:   1. Pseudopapilledema, bilateral         Patient was sent for consultation by Dr. Britta Mccall for papilledema versus pseudopapilledema.      Family history: mother with Chiari malformation and headaches. No FHx of CA    Past ocular history: Strabismus surgery as an infant, hyperopia options.    HPI: Patient was recently evaluated by Britta Mccall, OD on 7/19/2021 for blurry vision at both near and distance in both eyes for about 6 months.  It occurs most temporally.  She thinks this has been gradually worsening.  She thinks it is more of a sensation that she cannot see things  Concurrent with blurred vision patient has experienced increased frequency of headaches that are primarily temporal in location.  She currently gets them daily whereas prior headaches. History of tension headaches. Inability to focus while driving. No double vision now.  Denies flashes. Muscle fatigue when driving mainly. Patched one eye (she thinks the left eye) for years. Denies flashes or floaters.     Light sensitivity and sound (moreso) sensitivity with headaches. Denies N/V. Occasional vertigo but this is positional. No auras.    Independent historians: Patient    Review of outside testing: None available    My interpretation performed today of outside testing: N/A    Review of outside clinical notes: Britta Mccall right eye notes.     Past medical history: anxiety/depression, panic disorder  Medications: IUD, Singulair    Family history / social history:  EtOH: 1 drink per week  Tobacco: none  Drugs: denies    Exam:  Visual Acuity cc/ was 20/25 in  the right eye and 20/20 in the left eye. IOP was 14. Visual fields were full in both eyes. Ocular motility was full in all gaze directions. Color plates were 11/11 in the right eye and 11/11 in the left eye.  Pupils were equal, round and reactive to light with no RAPD.     Tests ordered and interpreted today:  OCT rNFL: within normal limits both eyes.    VF nonspecific defects both eyes.      Assessment/Plan:   It is my impression that patient has pseudopapilledema versus papilledema.  Her optical coherence tomography still falls within the normal range.  She doesn't have symptoms of increased intracranial pressure at this time.  I am not so convinced that she has true papilledema at this time.  Discussed option of observation with recheck OCT in 6-8 weeks.      Patient feels she is missing vision temporally LEFT eye.  This localizes to the nasal retinal periphery or far anterior right  occipital lobe.  Her nasal periphery looks good, there are no abnormalities there.  It is extremely unlikely to get an anterior occipital lobe lesion.  We discussed checking her far temporal periphery with kinetic perimetry but she deferred.            Attending Physician Attestation:  Complete documentation of historical and exam elements from today's encounter can be found in the full encounter summary report (not reduplicated in this progress note).  I personally obtained the chief complaint(s) and history of present illness.  I confirmed and edited as necessary the review of systems, past medical/surgical history, family history, social history, and examination findings as documented by others; and I examined the patient myself.  I personally reviewed the relevant tests, images, and reports as documented above.  I formulated and edited as necessary the assessment and plan and discussed the findings and management plan with the patient and family. I personally reviewed the ophthalmic test(s) associated with this encounter, agree  with the interpretation(s) as documented by the resident/fellow, and have edited the corresponding report(s) as necessary.  - Parker Youngblood, DO   Neuro-ophthalmology Fellow           Again, thank you for allowing me to participate in the care of your patient.      Sincerely,    Parker Fernandez MD

## 2021-08-26 NOTE — LETTER
2021         RE:  :  MRN: Pauly Callejas  1995  [id number]     Dear Dr. Mccall,    Thank you for asking me to see your very pleasant patient, Pauly Callejas, in neuro-ophthalmic consultation.  I would like to thank you for sending your records and I have summarized them in the history of present illness.My assessment and plan are below.  For further details, please see my attached clinic note.      Assessment & Plan     Pauly Callejas is a 26 year old female with the following diagnoses:   1. Pseudopapilledema, bilateral         Patient was sent for consultation by Dr. Britta Mccall for papilledema versus pseudopapilledema.      Family history: mother with Chiari malformation and headaches. No FHx of CA    Past ocular history: Strabismus surgery as an infant, hyperopia options.    HPI: Patient was recently evaluated by Britta Mccall, OD on 2021 for blurry vision at both near and distance in both eyes for about 6 months.  It occurs most temporally.  She thinks this has been gradually worsening.  She thinks it is more of a sensation that she cannot see things  Concurrent with blurred vision patient has experienced increased frequency of headaches that are primarily temporal in location.  She currently gets them daily whereas prior headaches. History of tension headaches. Inability to focus while driving. No double vision now.  Denies flashes. Muscle fatigue when driving mainly. Patched one eye (she thinks the left eye) for years. Denies flashes or floaters.     Light sensitivity and sound (moreso) sensitivity with headaches. Denies N/V. Occasional vertigo but this is positional. No auras.    Independent historians: Patient    Review of outside testing: None available    My interpretation performed today of outside testing: N/A    Review of outside clinical notes: Britta Mccall right eye notes.     Past medical history: anxiety/depression, panic disorder  Medications: IUD,  Singulair    Family history / social history:  EtOH: 1 drink per week  Tobacco: none  Drugs: denies    Exam:  Visual Acuity cc/ was 20/25 in the right eye and 20/20 in the left eye. IOP was 14. Visual fields were full in both eyes. Ocular motility was full in all gaze directions. Color plates were 11/11 in the right eye and 11/11 in the left eye.  Pupils were equal, round and reactive to light with no RAPD.     Tests ordered and interpreted today:  OCT rNFL: within normal limits both eyes.    VF nonspecific defects both eyes.      Assessment/Plan:   It is my impression that patient has pseudopapilledema versus papilledema.  Her optical coherence tomography still falls within the normal range.  She doesn't have symptoms of increased intracranial pressure at this time.  I am not so convinced that she has true papilledema at this time.  Discussed option of observation with recheck OCT in 6-8 weeks.      Patient feels she is missing vision temporally LEFT eye.  This localizes to the nasal retinal periphery or far anterior right  occipital lobe.  Her nasal periphery looks good, there are no abnormalities there.  It is extremely unlikely to get an anterior occipital lobe lesion.  We discussed checking her far temporal periphery with kinetic perimetry but she deferred.        Again, thank you for allowing me to participate in the care of your patient.      Sincerely,    Parker Fernandez MD  Professor  Ophthalmology Residency   Director of Neuro-Ophthalmology  [facility] Endowed Chair  Departments of Ophthalmology, Neurology, and Neurosurgery  [facility] [address], [place]  [address]  T - [phone]  F - [phone]  [email or web address] [email or web address]      CC: Britta Mccall, LOVE  [facility]  [address]  [facility] [address]  Via Fax: [phone]     Rosa Rojas PA-C  [address] [facility] [address]  Via In Basket    DX =  pseudopapilledema

## 2021-08-26 NOTE — NURSING NOTE
Chief Complaints and History of Present Illnesses   Patient presents with     New Patient     Chief Complaint(s) and History of Present Illness(es)     New Patient     Laterality: both eyes    Associated symptoms: photophobia.  Negative for flashes and double vision              Comments     New patient.  The patient notices a left peripheral visual field changes.  She notes discomfort and fatigue with eye movement.  She uses OTC allergy drops as needed.   The patient wears soft contact lenses.  History of amblyopia with eye patching as eighteen months.  Nelsy Saleh, COA, COA 7:17 AM 08/26/2021

## 2021-08-26 NOTE — PROGRESS NOTES
Assessment & Plan     Pauly Callejas is a 26 year old female with the following diagnoses:   1. Pseudopapilledema, bilateral         Patient was sent for consultation by Dr. Britta Mccall for papilledema versus pseudopapilledema.      Family history: mother with Chiari malformation and headaches. No FHx of CA    Past ocular history: Strabismus surgery as an infant, hyperopia options.    HPI: Patient was recently evaluated by Britta Mccall, OD on 7/19/2021 for blurry vision at both near and distance in both eyes for about 6 months.  It occurs most temporally.  She thinks this has been gradually worsening.  She thinks it is more of a sensation that she cannot see things  Concurrent with blurred vision patient has experienced increased frequency of headaches that are primarily temporal in location.  She currently gets them daily whereas prior headaches. History of tension headaches. Inability to focus while driving. No double vision now.  Denies flashes. Muscle fatigue when driving mainly. Patched one eye (she thinks the left eye) for years. Denies flashes or floaters.     Light sensitivity and sound (moreso) sensitivity with headaches. Denies N/V. Occasional vertigo but this is positional. No auras.    Independent historians: Patient    Review of outside testing: None available    My interpretation performed today of outside testing: N/A    Review of outside clinical notes: Britta Mccall right eye notes.     Past medical history: anxiety/depression, panic disorder  Medications: IUD, Singulair    Family history / social history:  EtOH: 1 drink per week  Tobacco: none  Drugs: denies    Exam:  Visual Acuity cc/ was 20/25 in the right eye and 20/20 in the left eye. IOP was 14. Visual fields were full in both eyes. Ocular motility was full in all gaze directions. Color plates were 11/11 in the right eye and 11/11 in the left eye.  Pupils were equal, round and reactive to light with no RAPD.     Tests ordered and  interpreted today:  OCT rNFL: within normal limits both eyes.    VF nonspecific defects both eyes.      Assessment/Plan:   It is my impression that patient has pseudopapilledema versus papilledema.  Her optical coherence tomography still falls within the normal range.  She doesn't have symptoms of increased intracranial pressure at this time.  I am not so convinced that she has true papilledema at this time.  Discussed option of observation with recheck OCT in 6-8 weeks.      Patient feels she is missing vision temporally LEFT eye.  This localizes to the nasal retinal periphery or far anterior right  occipital lobe.  Her nasal periphery looks good, there are no abnormalities there.  It is extremely unlikely to get an anterior occipital lobe lesion.  We discussed checking her far temporal periphery with kinetic perimetry but she deferred.            Attending Physician Attestation:  Complete documentation of historical and exam elements from today's encounter can be found in the full encounter summary report (not reduplicated in this progress note).  I personally obtained the chief complaint(s) and history of present illness.  I confirmed and edited as necessary the review of systems, past medical/surgical history, family history, social history, and examination findings as documented by others; and I examined the patient myself.  I personally reviewed the relevant tests, images, and reports as documented above.  I formulated and edited as necessary the assessment and plan and discussed the findings and management plan with the patient and family. I personally reviewed the ophthalmic test(s) associated with this encounter, agree with the interpretation(s) as documented by the resident/fellow, and have edited the corresponding report(s) as necessary.  - Parker Youngblood,    Neuro-ophthalmology Fellow

## 2021-11-21 ENCOUNTER — HEALTH MAINTENANCE LETTER (OUTPATIENT)
Age: 26
End: 2021-11-21

## 2023-04-23 ENCOUNTER — HEALTH MAINTENANCE LETTER (OUTPATIENT)
Age: 28
End: 2023-04-23

## 2024-06-30 ENCOUNTER — HEALTH MAINTENANCE LETTER (OUTPATIENT)
Age: 29
End: 2024-06-30